# Patient Record
Sex: FEMALE | Race: BLACK OR AFRICAN AMERICAN | NOT HISPANIC OR LATINO | URBAN - METROPOLITAN AREA
[De-identification: names, ages, dates, MRNs, and addresses within clinical notes are randomized per-mention and may not be internally consistent; named-entity substitution may affect disease eponyms.]

---

## 2022-05-14 ENCOUNTER — EMERGENCY (EMERGENCY)
Facility: HOSPITAL | Age: 67
LOS: 0 days | Discharge: HOME | End: 2022-05-14
Attending: STUDENT IN AN ORGANIZED HEALTH CARE EDUCATION/TRAINING PROGRAM
Payer: MEDICARE

## 2022-05-14 VITALS
DIASTOLIC BLOOD PRESSURE: 67 MMHG | SYSTOLIC BLOOD PRESSURE: 122 MMHG | WEIGHT: 130.07 LBS | RESPIRATION RATE: 16 BRPM | HEIGHT: 60 IN | HEART RATE: 83 BPM | OXYGEN SATURATION: 97 %
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
## 2022-05-14 DIAGNOSIS — S81.001D UNSPECIFIED OPEN WOUND, RIGHT KNEE, SUBSEQUENT ENCOUNTER: ICD-10-CM

## 2022-05-14 DIAGNOSIS — R35.0 FREQUENCY OF MICTURITION: ICD-10-CM

## 2022-05-14 DIAGNOSIS — R10.2 PELVIC AND PERINEAL PAIN: ICD-10-CM

## 2022-05-14 DIAGNOSIS — E78.5 HYPERLIPIDEMIA, UNSPECIFIED: ICD-10-CM

## 2022-05-14 DIAGNOSIS — Z23 ENCOUNTER FOR IMMUNIZATION: ICD-10-CM

## 2022-05-14 DIAGNOSIS — S71.102A UNSPECIFIED OPEN WOUND, LEFT THIGH, INITIAL ENCOUNTER: ICD-10-CM
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
## 2022-05-14 DIAGNOSIS — S71.102D UNSPECIFIED OPEN WOUND, LEFT THIGH, SUBSEQUENT ENCOUNTER: ICD-10-CM

## 2022-05-14 DIAGNOSIS — S81.001A UNSPECIFIED OPEN WOUND, RIGHT KNEE, INITIAL ENCOUNTER: ICD-10-CM
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
## 2022-05-14 LAB
APPEARANCE UR: CLEAR — SIGNIFICANT CHANGE UP
BACTERIA # UR AUTO: ABNORMAL
BILIRUB UR-MCNC: NEGATIVE — SIGNIFICANT CHANGE UP
COLOR SPEC: YELLOW — SIGNIFICANT CHANGE UP
DIFF PNL FLD: NEGATIVE — SIGNIFICANT CHANGE UP
EPI CELLS # UR: SIGNIFICANT CHANGE UP
GLUCOSE UR QL: NEGATIVE MG/DL — SIGNIFICANT CHANGE UP
KETONES UR-MCNC: NEGATIVE — SIGNIFICANT CHANGE UP
LEUKOCYTE ESTERASE UR-ACNC: ABNORMAL
NITRITE UR-MCNC: NEGATIVE — SIGNIFICANT CHANGE UP
PH UR: 6 — SIGNIFICANT CHANGE UP (ref 5–8)
PROT UR-MCNC: NEGATIVE MG/DL — SIGNIFICANT CHANGE UP
SP GR SPEC: 1 — LOW (ref 1.01–1.02)
UROBILINOGEN FLD QL: NEGATIVE MG/DL — SIGNIFICANT CHANGE UP
WBC UR QL: SIGNIFICANT CHANGE UP

## 2022-05-14 PROCEDURE — 99283 EMERGENCY DEPT VISIT LOW MDM: CPT | Mod: FS

## 2022-05-14 RX ORDER — TETANUS TOXOID, REDUCED DIPHTHERIA TOXOID AND ACELLULAR PERTUSSIS VACCINE, ADSORBED 5; 2.5; 8; 8; 2.5 [IU]/.5ML; [IU]/.5ML; UG/.5ML; UG/.5ML; UG/.5ML
0.5 SUSPENSION INTRAMUSCULAR ONCE
Refills: 0 | Status: COMPLETED | OUTPATIENT
Start: 2022-05-14 | End: 2022-05-14

## 2022-05-14 RX ORDER — ACETAMINOPHEN 500 MG
650 TABLET ORAL ONCE
Refills: 0 | Status: COMPLETED | OUTPATIENT
Start: 2022-05-14 | End: 2022-05-14

## 2022-05-14 RX ORDER — IBUPROFEN 200 MG
600 TABLET ORAL ONCE
Refills: 0 | Status: COMPLETED | OUTPATIENT
Start: 2022-05-14 | End: 2022-05-14

## 2022-05-14 RX ADMIN — Medication 650 MILLIGRAM(S): at 11:43

## 2022-05-14 RX ADMIN — TETANUS TOXOID, REDUCED DIPHTHERIA TOXOID AND ACELLULAR PERTUSSIS VACCINE, ADSORBED 0.5 MILLILITER(S): 5; 2.5; 8; 8; 2.5 SUSPENSION INTRAMUSCULAR at 11:46

## 2022-05-14 RX ADMIN — Medication 600 MILLIGRAM(S): at 11:44

## 2022-05-14 NOTE — ED PROVIDER NOTE - SKIN, MLM
Skin normal color for race, warm, dry and intact. R irregular shaped, long wound with partial blistering, no surrounding erythema, pus drainage or increased warmth. 2 circular [id number] wounds to L  medial thigh. Minimal erythema, no pus drainage or inc warmth.

## 2022-05-14 NOTE — ED PROVIDER NOTE - ATTENDING APP SHARED VISIT CONTRIBUTION OF CARE
Alexandrea: Pt seen w/ PA, 66F w/ PMH down syndrome, HLD BIB sister / primary caretaker d/t 1) 1 blister to R knee and 2 blisters to L upper medial thigh noted this AM. Family concern is for infection. 2) urinary frequency, pt complaining of vaginal discomfort. Pt seen by GYN 3mo ago. AF, VSS. Well appearing, in NAD. Exam as noted in PE. Agree w/ planned w/u. Alexandrea: Pt seen w/ PA, 66F w/ PMH down syndrome, HLD BIB sister / primary caretaker d/t 1) 1 blister to R knee and 2 blisters to L upper medial thigh noted this AM. Family concern is for infection. 2) urinary frequency, pt complaining of vaginal discomfort. Pt seen by GYN 3mo ago. AF, VSS. Well appearing, in NAD. Exam as noted in PE. Agree w/ planned w/u. UA negative for infection. Stable for d/c home. Instructed for close outpatient PCP f/u. Return signs / symptoms d/w pt, caretaker. They understand / agree w/ this plan.

## 2022-05-14 NOTE — ED ADULT TRIAGE NOTE - HEIGHT IN FEET
5 Medical clearance needed.   CBC, Comprehensive panel, PT/PTT, T&S, HgbA1c, UA, Urine culture, EKG, Nose culture, CXR and X-ray of right hip and pelvis ordered.   Pre-op instructions and 3 days of surgical scrubs given and pt verbalized understanding. Medical clearance needed.   CBC, Comprehensive panel, PT/PTT, T&S, HgbA1c, UA, Urine culture, EKG, Nose culture, CXR and X-ray of right hip and pelvis ordered.   Pre-op instructions and 3 days of surgical scrubs given and pt verbalized understanding.    ADDENDUM: Positive nose culture for MSSA received. Spoke with pt's mother [place] and instructed use of mupirocin ointment as directed. She verbalized understanding. Medical clearance needed.   CBC, Comprehensive panel, PT/PTT, T&S, HgbA1c, UA, Urine culture, EKG, Nose culture, CXR and X-ray of right hip and pelvis ordered.   Pre-op instructions and 3 days of surgical scrubs given and pt verbalized understanding.    ADDENDUM: Positive nose culture for MSSA received. Spoke with pt's mother Quin and instructed use of mupirocin ointment as directed. She verbalized understanding. Medical clearance needed.   CBC, Comprehensive panel, PT/PTT, T&S, HgbA1c, UA, Urine culture, EKG, Nose culture, CXR and X-ray of right hip and pelvis ordered.   Pre-op instructions and 3 days of surgical scrubs given and pt verbalized understanding.    ADDENDUM: Positive nose culture for MSSA received. Spoke with pt's mother Quin and she will tell pt to use mupirocin ointment as directed. She verbalized understanding.

## 2022-05-14 NOTE — ED PROVIDER NOTE - NSFOLLOWUPINSTRUCTIONS_ED_ALL_ED_FT
Today you were seen in the ER for wound check.     Take Motrin 600 mg every 8 hours as needed for moderate pain or fevers -- take with food.    Take Tylenol 650mg (Two 325 mg pills) every 4-6 hours as needed for pain or fevers.    Please see below for a copy of your results.     Advance activity as tolerated.      Continue all previously prescribed medications as directed unless otherwise instructed.      Follow up with your primary care physician and GYN in 48-72 hours- bring copies of your results.      Return to the ER for worsening or persistent symptoms, and/or ANY NEW OR CONCERNING SYMPTOMS SEEK IMMEDIATE MEDICAL CARE IF YOU HAVE ANY OF THE FOLLOWING SYMPTOMS: worsening fever, red streaks coming from affected area, vomiting or diarrhea, or dizziness/lightheadedness.

## 2022-05-14 NOTE — ED ADULT NURSE NOTE - OBJECTIVE STATEMENT
pt presented to ER, AOX2 and ambulatory, with complaints of wound check. As per pt's sister, who is primary caregiver, pt was given a shower this morning and the sister saw two wounds on inner left thigh,. pt's sister concerned that pt is picking her skin. pt has c/o vaginal irritation. reports seeing GYN three month ago for persistent "problem down there". pt has hx of down syndrome, HLD. denies fever, chills, N/V/D [] : No [de-identified] : No pain 0/10

## 2022-05-14 NOTE — ED PROVIDER NOTE - OBJECTIVE STATEMENT
66y Female with PMHx of HLD, down syndrome presents to the ER for wound check. Per sister (care taker), when bathing her sister this morning, noticed wound to R knee and L inner thigh. Concern for infection so came in for evaluation. Unclear how or when she got the wounds. Last tetanus unknown. Denies fever, chills, pus drainage. In addition, reporting increased urinary frequency. Patient point to vagina, discomfort with urination. Seen by GYN in the past for similar symptoms, given cream to control. Denies abnormal discharge. Patient cared for by sister Jolynn 24/7 unless she has to go out then their other sisters assist.

## 2022-05-14 NOTE — ED PROVIDER NOTE - NS ED ATTENDING STATEMENT MOD
This was a shared visit with the FANNY. I reviewed and verified the documentation and independently performed the documented:

## 2022-05-14 NOTE — ED PROVIDER NOTE - PATIENT PORTAL LINK FT
You can access the FollowMyHealth Patient Portal offered by [facility] by registering at the following website: [email or web address]. By joining "[employer]’s FollowMyHealth portal, you will also be able to view your health information using other applications (apps) compatible with our system.

## 2022-05-14 NOTE — ED PROVIDER NOTE - NS ED ROS FT
Constitutional: (-) Fever, (-) Chills  Skin: (+) Wounds  Nose: (-) Nasal congestion, (-) Runny nose  Mouth/Throat: (-) Sore throat  Resp: (-) Cough  GI: (-) Abdominal pain, (-) Nausea, (-) Vomiting, (-) Diarrhea  : (-) Dysuria, (-) Hematuria, (+) Increased frequency  MSK: (-) Back pain, (-) Neck pain  Neuro: (-) Headache

## 2022-05-14 NOTE — ED ADULT TRIAGE NOTE - CHIEF COMPLAINT QUOTE
painful open sores to left inner thigh and right knee observed this morning while having her bath, patient pointing to vaginal area during triage, h/o Downs syndrome and high cholesterol

## 2022-05-14 NOTE — ED ADULT NURSE NOTE - BRAND OF FIRST COVID-19 BOOSTER
Patient presents for weight  Check and needed refill of zoloft. Provider notified of weight.   
[employer]

## 2022-05-14 NOTE — ED PROVIDER NOTE - CLINICAL SUMMARY MEDICAL DECISION MAKING FREE TEXT BOX
66y Female with PMHx of HLD, down syndrome presents to the ER for wound check. Wound to R knee and L inner thigh. Concern for infection so came in for evaluation. Last tetanus unknown. Denies fever, chills, pus drainage. +increased urinary frequency. Vital sign stable, exam as noted above. Will check for UTI, pain control, update tetanus. Plan to dc with PMD and GYN follow up.

## 2022-05-15 LAB
CULTURE RESULTS: SIGNIFICANT CHANGE UP
SPECIMEN SOURCE: SIGNIFICANT CHANGE UP